# Patient Record
Sex: FEMALE | Race: OTHER | NOT HISPANIC OR LATINO
[De-identification: names, ages, dates, MRNs, and addresses within clinical notes are randomized per-mention and may not be internally consistent; named-entity substitution may affect disease eponyms.]

---

## 2019-06-27 PROBLEM — Z00.00 ENCOUNTER FOR PREVENTIVE HEALTH EXAMINATION: Status: ACTIVE | Noted: 2019-06-27

## 2019-07-24 ENCOUNTER — APPOINTMENT (OUTPATIENT)
Dept: NEUROLOGY | Facility: CLINIC | Age: 84
End: 2019-07-24
Payer: MEDICARE

## 2019-07-24 VITALS — SYSTOLIC BLOOD PRESSURE: 192 MMHG | DIASTOLIC BLOOD PRESSURE: 64 MMHG

## 2019-07-24 VITALS
TEMPERATURE: 98.5 F | SYSTOLIC BLOOD PRESSURE: 183 MMHG | OXYGEN SATURATION: 97 % | BODY MASS INDEX: 19.63 KG/M2 | DIASTOLIC BLOOD PRESSURE: 64 MMHG | HEIGHT: 60 IN | WEIGHT: 100 LBS | HEART RATE: 64 BPM

## 2019-07-24 DIAGNOSIS — R26.9 UNSPECIFIED ABNORMALITIES OF GAIT AND MOBILITY: ICD-10-CM

## 2019-07-24 PROCEDURE — 99205 OFFICE O/P NEW HI 60 MIN: CPT

## 2019-07-24 NOTE — PHYSICAL EXAM
[General Appearance - Well Nourished] : well nourished [General Appearance - Alert] : alert [General Appearance - In No Acute Distress] : in no acute distress [General Appearance - Well-Appearing] : healthy appearing [General Appearance - Well Developed] : well developed [Affect] : the affect was normal [Mood] : the mood was normal [Span Intact] : the attention span was normal [Concentration Intact] : normal concentrating ability [Fluency] : fluency intact [Comprehension] : comprehension intact [Motor Handedness Right-Handed] : the patient is right hand dominant [Sensation Tactile Decrease] : light touch was intact [Sensation Pain / Temperature Decrease] : pain and temperature was intact [1+] : Brachioradialis left 1+ [0] : Patella left 0 [FreeTextEntry1] : There is generalized motor slowing, with possibly a mild asymmetry, with the left side slower and with a slight worse performance on dexterity tasks.\par There is no tremor, no rigidity (some paratonia).\par She can stand without using her hands from the sitting position but her gait is hesitant, with mild widening of the base, and with some suggestion of magnetic gait. No shuffling.\par Postural reflexes are overall preserved. [Paresis Pronator Drift Right-Sided] : no pronator drift on the right [Paresis Pronator Drift Left-Sided] : no pronator drift on the left [Motor Strength Upper Extremities Bilaterally] : strength was normal in both upper extremities [Motor Strength Lower Extremities Bilaterally] : strength was normal in both lower extremities [Allodynia] : no ~T allodynia present [Dysesthesia] : no dysesthesia [Hyperesthesia] : no hyperesthesia [Coordination - Dysmetria Impaired Finger-to-Nose Bilateral] : not present [Dysdiadochokinesia Bilaterally] : not present [Plantar Reflex Right Only] : normal on the right [Plantar Reflex Left Only] : normal on the left [FreeTextEntry7] : there is loss of vibration in the lower extremities, to the knees, and loss of prorpiocepion also in the lower extremities

## 2019-07-24 NOTE — ASSESSMENT
[FreeTextEntry1] : 89 yo woman with cognitive problems and gait disorder that developed over the past 2 years, with progressive worsening.\par She has had a number of falls, with a hip fracture (late 2017) and a finger fracture (1 moth ago), but over the past month, since she moved in with her sister, there have not been falls.\par \par On exam there is evidence of significant cognitive decline, generalized slowing of movement, loss of vibration ad proprioception in the lower extremities.\par \par Will review laboratory results\par May request  TSH, B12, Folcate, Methylmalonic acid.\par Will obtain MRI\par \par Encouraged to increase exercise and physical activity and maintain an active social and intellectual life.\par More than 50% of the visit were dedicated to review of treatment, therapeutic plan, and prognosis, and patient was counseled on coping and physical and emotional wellbeing.\par \par \par
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
## 2019-07-25 LAB
FOLATE SERPL-MCNC: >20 NG/ML
TSH SERPL-ACNC: 1.48 UIU/ML
VIT B12 SERPL-MCNC: 803 PG/ML

## 2019-07-29 LAB — METHYLMALONATE SERPL-SCNC: 239 NMOL/L

## 2019-08-07 ENCOUNTER — OUTPATIENT (OUTPATIENT)
Dept: OUTPATIENT SERVICES | Facility: HOSPITAL | Age: 84
LOS: 1 days | End: 2019-08-07

## 2019-08-07 DIAGNOSIS — Z01.89 ENCOUNTER FOR OTHER SPECIFIED SPECIAL EXAMINATIONS: ICD-10-CM

## 2019-09-05 ENCOUNTER — APPOINTMENT (OUTPATIENT)
Dept: NEUROLOGY | Facility: CLINIC | Age: 84
End: 2019-09-05
Payer: MEDICARE

## 2019-09-05 VITALS
HEART RATE: 63 BPM | TEMPERATURE: 98.7 F | HEIGHT: 60 IN | OXYGEN SATURATION: 94 % | WEIGHT: 100 LBS | BODY MASS INDEX: 19.63 KG/M2 | SYSTOLIC BLOOD PRESSURE: 119 MMHG | DIASTOLIC BLOOD PRESSURE: 65 MMHG

## 2019-09-05 VITALS — SYSTOLIC BLOOD PRESSURE: 137 MMHG | DIASTOLIC BLOOD PRESSURE: 73 MMHG

## 2019-09-05 DIAGNOSIS — F03.90 UNSPECIFIED DEMENTIA W/OUT BEHAVIORAL DISTURBANCE: ICD-10-CM

## 2019-09-05 PROCEDURE — 99215 OFFICE O/P EST HI 40 MIN: CPT

## 2019-09-05 NOTE — DATA REVIEWED
[de-identified] : MRI brain 7/30/2019 showed chronic microvascular changes and parenchymal volume loss, stable in comparison to her MRI done on 5/26/2019.

## 2019-09-05 NOTE — HISTORY OF PRESENT ILLNESS
[FreeTextEntry1] : Josie Castañeda is a 89 yo RH female patient has experienced short term memory loss, a series of falls, loss of balance, urinary frequency/nocturia and daytime somnolence (falls asleep in public) since late 2018. She was referred by neuro psych, Dr. Veronica Tejeda. She has not had any MRI or Blessing scans completed. She is here for follow up.\par \par Initial history:\par Pt states she is very hesitant walking. She uses a walker both in and out of the home. She denies any shuffling however, she states her OT has noticed. She states she has had a loss of balance and needs to hold on to things at home if she walks without a walker. Her sister states that whenever she fell and hit her head she would be sent to the ED due to concerns with Eliquis. She has encountered two fractures during the falls. She denies any LOC, SDH, or traumatic brain injuries related to the falls. Two years ago, she had a serious fall in the bathroom when wearing socks and slipped that resulted in a fracture of L hip. The hip fracture was surgically fixed after. Pt denies any tremors. Pt states she is able to perform most ADLs independently with assistance when needed. She experiences some difficulty with fine motor movements such as buttoning clothes. \par \par Since the last visit, her labs for B12, folate, TSH and methylmalonic acid returned within normal limits. Her MRI of the brain showed chronic microvascular disease and parenchymal volume loss. She has had two falls at home. Daytime sleepiness has improved, but about 10 days ago and was having breakfast, and the patient had fallen off the chair. The other fall was losing balance while getting something out of the closet. She has had about 11 falls in the last 6 months. Short term memory is worsening. She forgets things that she was told maybe 20 minutes prior i.e. meeting her sister at the corner store. In April 2019, she moved in with her sister when the symptoms were not as severe as they are now. 1.5 months ago walked to another part of her neighbourhood, which is familiar to her, looked up and was confused about where she was. But, was able to find herself home after someone redirected her.\par \par Pt has a history of labile blood pressures, tends to be more hypotensive. Pt denies dizziness, lightheadedness, and other near syncopal episodes related to OH. Today BP is 119/65 sitting and 137/73 standing.\par \par Pt states she is not as active. \par Pt had PT coming to the house, but now is going to start outpatient therapy. \par She completed OT. \par \par Pt states sleep is normal. Pt states she experiences nightmares and vivid dreams. Pt denies screaming, kicking, hitting acting out dreams or other related RBD movements. \par Pt states she experiences urinary frequency and nocturia. She takes about 3-4 bathroom trips a night.\par Pt states she has noticed a change in short term memory loss such as forgetting to take her medications and got lost going home.\par Pt states she believes recently she feels down due to age related changes and her memory impairment.\par Pt states she experiences severe daytime somnolence. 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 Does have an aid that comes in twice a week. \par Goes out a lot with her sister\par \par Medical History\par One occurrence of SVT; now on amiodarone\par Hypotension\par Rheumatoid Arthritis\par Glaucoma\par Osteoporosis \par Breast cancer- lumpectomy; 15 yrs ago\par Hypothyroidism\par Herpes\par \par Family History\par Pt denies any family history of PD or movement disorders.

## 2019-09-05 NOTE — ASSESSMENT
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 She has had a number of falls, with a hip fracture (late 2017) and a finger fracture (June 2019), but over the past month, since she moved in with her sister in April 2019, there has been progressive worsening of her memory, daytime somnolence, and falls. She has fallen at least 11 times in the last 6 months.\par \par On exam there is evidence of significant cognitive decline (MoCA 17/30), generalized slowing of movement, loss of vibration ad proprioception in the lower extremities, unable to tandem walk.\par \par MRI brain done at [facility] in July 2019: chronic microvascular changes and parenchymal volume loss diffusely. \par B12, folate, TSH, and methylmalonic acid within normal limits.\par Sodium level previously 132. \par \par Recommendations:\par - Will start Aricept 5mg daily x 4 weeks, then increase to 10mg daily\par - After two weeks of Aricept 5mg daily, will start Provigil 50mg daily, increasing by 50mg each week to a goal dose of 100mg BID (schedule provided to the patient's sister)\par - Encouraged to increase exercise and physical activity and maintain an active social and intellectual life.\par \par More than 50% of the visit were dedicated to review of treatment, therapeutic plan, and prognosis, and patient was counseled on coping and physical and emotional wellbeing.\par \par RTC 2-3 months\par

## 2019-09-05 NOTE — PHYSICAL EXAM
[General Appearance - Alert] : alert [General Appearance - In No Acute Distress] : in no acute distress [General Appearance - Well Nourished] : well nourished [General Appearance - Well Developed] : well developed [Affect] : the affect was normal [General Appearance - Well-Appearing] : healthy appearing [Mood] : the mood was normal [Span Intact] : the attention span was normal [Concentration Intact] : normal concentrating ability [Fluency] : fluency intact [Motor Handedness Right-Handed] : the patient is right hand dominant [Comprehension] : comprehension intact [Sensation Tactile Decrease] : light touch was intact [Sensation Pain / Temperature Decrease] : pain and temperature was intact [1+] : Brachioradialis left 1+ [0] : Ankle jerk left 0 [___ / 30] : the patient achieved a total score of [unfilled] /30 [___ / 5] : Visuospatial / Executive: [unfilled] / 5 [0 / 0] : Memory: 0 / 0 [___ / 3] : Attention (Serial 7 subtraction): [unfilled] / 3 [___ / 1] : Fluency: [unfilled] / 1 [___ / 2] : Abstraction: [unfilled] / 2 [___ / 5] : Delayed Recall: [unfilled] / 5 [___ / 6] : Orientation: [unfilled] / 6 [Person] : oriented to person [FreeTextEntry1] : There is generalized motor slowing, with possibly a mild asymmetry, with the left side slower and with a slight worse performance on dexterity tasks.\par There is no tremor, no rigidity (some paratonia).\par She can stand without using her hands from the sitting position but her gait is hesitant, with mild widening of the base, with short stride, and occasional magnetism of the R foot. No shuffling. Mild decreased arm swing on the R.\par Postural reflexes are preserved. She is unable to tandem walk.\par Negative Glabellar sign, No palmomental sign, and no grasp reflex. [Place] : disoriented to place [Time] : disoriented to time [Paresis Pronator Drift Right-Sided] : no pronator drift on the right [Paresis Pronator Drift Left-Sided] : no pronator drift on the left [Motor Strength Upper Extremities Bilaterally] : strength was normal in both upper extremities [Allodynia] : no ~T allodynia present [Motor Strength Lower Extremities Bilaterally] : strength was normal in both lower extremities [Dysesthesia] : no dysesthesia [Hyperesthesia] : no hyperesthesia [Dysdiadochokinesia Bilaterally] : not present [Coordination - Dysmetria Impaired Finger-to-Nose Bilateral] : not present [Plantar Reflex Left Only] : normal on the left [Plantar Reflex Right Only] : normal on the right [FreeTextEntry7] : there is loss of vibration in the lower extremities, to the knees, and loss of prorpiocepion also in the lower extremities

## 2019-09-05 NOTE — END OF VISIT
[] : Fellow [FreeTextEntry3] : Patient's history reviewed with fellow, patient examined with fellow, assessment and plan discussed with fellow.\par

## 2019-09-13 ENCOUNTER — RX RENEWAL (OUTPATIENT)
Age: 84
End: 2019-09-13

## 2019-11-20 ENCOUNTER — APPOINTMENT (OUTPATIENT)
Dept: NEUROLOGY | Facility: CLINIC | Age: 84
End: 2019-11-20
Payer: MEDICARE

## 2019-11-20 VITALS — DIASTOLIC BLOOD PRESSURE: 69 MMHG | OXYGEN SATURATION: 100 % | SYSTOLIC BLOOD PRESSURE: 153 MMHG | HEART RATE: 55 BPM

## 2019-11-20 VITALS — SYSTOLIC BLOOD PRESSURE: 168 MMHG | DIASTOLIC BLOOD PRESSURE: 71 MMHG | OXYGEN SATURATION: 98 % | HEART RATE: 55 BPM

## 2019-11-20 PROCEDURE — 99215 OFFICE O/P EST HI 40 MIN: CPT

## 2019-11-20 RX ORDER — DONEPEZIL HYDROCHLORIDE 10 MG/1
10 TABLET ORAL DAILY
Qty: 90 | Refills: 3 | Status: DISCONTINUED | COMMUNITY
Start: 2019-09-05 | End: 2019-11-20
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
## 2019-11-20 NOTE — ASSESSMENT
[FreeTextEntry1] : \par 89 yo woman with cognitive problems and gait disorder that developed since 2016 with progressive worsening. She has had a number of falls, with a hip fracture (late 2017) and a finger fracture (June 2019), but over the past month, since she moved in with her sister in April 2019, there has been progressive worsening of her memory, daytime somnolence, and falls. She had 2 falls since last visit, with no injuries\par \par On exam there is evidence of significant cognitive decline (MoCA 17/30 in September 2019), generalized slowing of movement, loss of vibration ad proprioception in the lower extremities, unable to tandem walk.\par MRI brain done at [facility] in July 2019: chronic microvascular changes and parenchymal volume loss diffusely. \par B12, folate, TSH, and methylmalonic acid within normal limits.\par Sodium level previously 132. \par \par Did not start Aricept and Provigil as recommend because of concern for side effect, cardiology concern for QT prolongation and general concern for Provigil.\par \par Discussed again benefit of Provigil, which may be beneficial as her main complaint is driowsiness and daytime somnolence.\par Will start Provigil 50mg daily, increasing by 50mg each week to a goal dose of 100mg BID (schedule provided to the patient's sister)\par - Encouraged to increase exercise and physical activity and maintain an active social and intellectual life.\par \par Discussed treatment, therapeutic plan, and prognosis, and patient was counseled on lifestyle, coping, and physical and emotional wellbeing.\par

## 2020-03-04 ENCOUNTER — APPOINTMENT (OUTPATIENT)
Dept: NEUROLOGY | Facility: CLINIC | Age: 85
End: 2020-03-04
Payer: MEDICARE

## 2020-03-04 VITALS
DIASTOLIC BLOOD PRESSURE: 64 MMHG | TEMPERATURE: 98.7 F | WEIGHT: 105 LBS | SYSTOLIC BLOOD PRESSURE: 167 MMHG | BODY MASS INDEX: 20.51 KG/M2 | HEART RATE: 57 BPM | OXYGEN SATURATION: 97 %

## 2020-03-04 PROCEDURE — 99214 OFFICE O/P EST MOD 30 MIN: CPT

## 2020-07-08 ENCOUNTER — APPOINTMENT (OUTPATIENT)
Dept: NEUROLOGY | Facility: CLINIC | Age: 85
End: 2020-07-08
Payer: MEDICARE

## 2020-07-08 VITALS
HEART RATE: 57 BPM | DIASTOLIC BLOOD PRESSURE: 65 MMHG | OXYGEN SATURATION: 97 % | SYSTOLIC BLOOD PRESSURE: 179 MMHG | TEMPERATURE: 98.2 F

## 2020-07-08 VITALS — DIASTOLIC BLOOD PRESSURE: 64 MMHG | SYSTOLIC BLOOD PRESSURE: 176 MMHG

## 2020-07-08 PROCEDURE — 99214 OFFICE O/P EST MOD 30 MIN: CPT

## 2020-07-08 NOTE — ASSESSMENT
[FreeTextEntry1] : 89 yo woman with cognitive problems and gait disorder that developed since 2016 with progressive worsening. She has had a number of falls, with a hip fracture (late 2017) and a finger fracture (June 2019). She had extensive work-up MRI brain done at [facility] in July 2019: chronic microvascular changes and parenchymal volume loss diffusely. B12, folate, TSH, and methylmalonic acid within normal limits.\par \par Since her previous visit she has improved her balance is better, she had only one fall in the past 4 months,  , and her cognitive function has not declined further.\par The main problem remains daytime somnolence.\par \par Did not start Aricept and Provigil as recommend at previous visits.\par \par Plan:\par Discussed again benefit of Provigil, which may be beneficial as her main complaint is drowsiness and daytime somnolence.\par Recommended again Provigil 50 mg daily, increasing by 50 mg each week to a goal dose of 100 mg BID (schedule provided to the patient's sister)\par \par Encouraged to increase exercise and physical activity and maintain an active social and intellectual life, compatible with COVID restrictions\par \par More than 50 % of time was used to discuss treatment, therapeutic plan, and prognosis, and patient was counseled on lifestyle, coping, and physical and emotional wellbeing.\par

## 2020-07-08 NOTE — PHYSICAL EXAM
[FreeTextEntry1] : \par Overall exam mildly improved.\par There is mild generalized motor slowing, with possibly a mild asymmetry, with the left side slower and with a slight worse performance on dexterity tasks.\par There is no tremor, no rigidity with mild paratonia.\par She can stand with folded arms with ease (could not do so on the past) without using her hands from the sitting position but her gait is hesitant, with mild widening of the base, with short stride, there is no longer magnetism in her gait. No shuffling. Mild decreased arm swing on the R. Can walk with ease when holding to my arm.\par Postural reflexes are mildly impaired.\par \par Constitutional: mild cognitive impairment with decreased attention and memory. She is alert, in no acute distress.\par Psychiatric: the affect was normal and the mood was normal. \par Attention span was normal and normal concentrating ability. \par Language fluency intact and comprehension intact. \par  \par

## 2020-07-08 NOTE — HISTORY OF PRESENT ILLNESS
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 She has had a number of falls, with a hip fracture (late 2017) and a finger fracture (June 2019), but over the past month, since she moved in with her sister in April 2019, there has been progressive worsening of her memory, daytime somnolence, and falls. She has fallen at least 11 times in the last 6 months.\par \par Since the last visit , pt was advised to start  Provigil 50 mg daily, increasing by 50 mg each week to a goal dose of 100 mg BID (schedule provided to the patient's sister) which she has not started. Pt sister states she falls asleep every morning at breakfast table. \par \par Overall, pt states her balance is unchanged since last visit. Pt currently ambulates using walker. While in her house, she is now trying to not use the walker and also tries to not hold to furniture. When leaving the house, she necessitates a walker with supervision of an aide. Pt states she had 1 recent fall. Pt was walking and tripped on a rug; she was unable to get up alone or with her sister. They called a friend to help her up from the floor. Pt has some difficulty with manual dexterity including buttons and zippers. Pt is able to perform ADLs independently with some supervision when bathing.\par \par Pt has a history of labile blood pressures, tends to be more hypotensive. Pt denies dizziness, lightheadedness, and other near syncopal episodes related to OH. Today BP is 179/65 sitting and 176/64 standing. 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  \par \par Pt states sleep is normal with about 8-9 hr/night. Pt denies any nightmares or vivid dreams. Pt denies any RBD related movements.\par Pt states she experiences constipation.\par Pt states she experiences urinary frequency and nocturia. She takes about 3-4 bathroom trips a night.\par Pt states she has noticed a change in short term memory loss such as forgetting to take her medications and got lost going home. She decided not to start the Aricept. \par Pt states she experiences daytime somnolence. She does not take naps. She decided not to start the Provigil.\par \par Medications:\par Provigil 100 mg --> not started\par Lipitor 10 mg\par Amiodarone HCl 100 mg QD\par Eliquis 2.5 mg BID\par Folic Acid \par

## 2020-07-13 ENCOUNTER — APPOINTMENT (OUTPATIENT)
Dept: NEUROLOGY | Facility: CLINIC | Age: 85
End: 2020-07-13
Payer: MEDICARE

## 2020-07-13 PROCEDURE — 99213 OFFICE O/P EST LOW 20 MIN: CPT | Mod: 95

## 2020-07-23 ENCOUNTER — APPOINTMENT (OUTPATIENT)
Dept: NEUROLOGY | Facility: CLINIC | Age: 85
End: 2020-07-23
Payer: MEDICARE

## 2020-07-23 PROCEDURE — 99442: CPT | Mod: 95

## 2020-07-31 ENCOUNTER — APPOINTMENT (OUTPATIENT)
Dept: NEUROLOGY | Facility: CLINIC | Age: 85
End: 2020-07-31
Payer: MEDICARE

## 2020-07-31 PROCEDURE — 99215 OFFICE O/P EST HI 40 MIN: CPT | Mod: 95

## 2020-08-11 ENCOUNTER — APPOINTMENT (OUTPATIENT)
Dept: NEUROLOGY | Facility: CLINIC | Age: 85
End: 2020-08-11
Payer: MEDICARE

## 2020-08-11 PROCEDURE — 99215 OFFICE O/P EST HI 40 MIN: CPT | Mod: 95

## 2020-08-18 ENCOUNTER — APPOINTMENT (OUTPATIENT)
Dept: NEUROLOGY | Facility: CLINIC | Age: 85
End: 2020-08-18
Payer: MEDICARE

## 2020-08-18 PROCEDURE — 99443: CPT | Mod: 95

## 2020-08-25 ENCOUNTER — APPOINTMENT (OUTPATIENT)
Dept: NEUROLOGY | Facility: CLINIC | Age: 85
End: 2020-08-25
Payer: MEDICARE

## 2020-08-25 ENCOUNTER — NON-APPOINTMENT (OUTPATIENT)
Age: 85
End: 2020-08-25

## 2020-08-25 VITALS — SYSTOLIC BLOOD PRESSURE: 174 MMHG | DIASTOLIC BLOOD PRESSURE: 54 MMHG

## 2020-08-25 VITALS
HEART RATE: 59 BPM | DIASTOLIC BLOOD PRESSURE: 68 MMHG | TEMPERATURE: 97 F | SYSTOLIC BLOOD PRESSURE: 159 MMHG | HEIGHT: 60 IN | OXYGEN SATURATION: 97 %

## 2020-08-25 PROCEDURE — 99215 OFFICE O/P EST HI 40 MIN: CPT

## 2020-09-04 ENCOUNTER — NON-APPOINTMENT (OUTPATIENT)
Age: 85
End: 2020-09-04

## 2020-09-04 ENCOUNTER — APPOINTMENT (OUTPATIENT)
Dept: NEUROLOGY | Facility: CLINIC | Age: 85
End: 2020-09-04
Payer: MEDICARE

## 2020-09-04 DIAGNOSIS — R35.0 FREQUENCY OF MICTURITION: ICD-10-CM

## 2020-09-04 PROCEDURE — 99215 OFFICE O/P EST HI 40 MIN: CPT | Mod: 95

## 2020-09-09 ENCOUNTER — APPOINTMENT (OUTPATIENT)
Dept: NEUROLOGY | Facility: CLINIC | Age: 85
End: 2020-09-09
Payer: MEDICARE

## 2020-09-09 PROCEDURE — 99442: CPT | Mod: 95

## 2020-11-18 ENCOUNTER — APPOINTMENT (OUTPATIENT)
Dept: NEUROLOGY | Facility: CLINIC | Age: 85
End: 2020-11-18

## 2020-11-30 DIAGNOSIS — R40.0 SOMNOLENCE: ICD-10-CM

## 2021-01-27 ENCOUNTER — APPOINTMENT (OUTPATIENT)
Dept: NEUROLOGY | Facility: CLINIC | Age: 86
End: 2021-01-27
Payer: MEDICARE

## 2021-01-27 VITALS
SYSTOLIC BLOOD PRESSURE: 173 MMHG | TEMPERATURE: 97.3 F | BODY MASS INDEX: 17.47 KG/M2 | DIASTOLIC BLOOD PRESSURE: 63 MMHG | WEIGHT: 89 LBS | HEIGHT: 60 IN | HEART RATE: 87 BPM | OXYGEN SATURATION: 97 %

## 2021-01-27 VITALS — OXYGEN SATURATION: 100 % | SYSTOLIC BLOOD PRESSURE: 199 MMHG | HEART RATE: 67 BPM | DIASTOLIC BLOOD PRESSURE: 68 MMHG

## 2021-01-27 LAB
25(OH)D3 SERPL-MCNC: 43.9 NG/ML
FOLATE SERPL-MCNC: >20 NG/ML
VIT B12 SERPL-MCNC: 943 PG/ML

## 2021-01-27 PROCEDURE — 99215 OFFICE O/P EST HI 40 MIN: CPT

## 2021-03-15 ENCOUNTER — APPOINTMENT (OUTPATIENT)
Dept: NEUROLOGY | Facility: CLINIC | Age: 86
End: 2021-03-15
Payer: MEDICARE

## 2021-03-15 VITALS
WEIGHT: 97 LBS | BODY MASS INDEX: 19.04 KG/M2 | SYSTOLIC BLOOD PRESSURE: 135 MMHG | DIASTOLIC BLOOD PRESSURE: 69 MMHG | HEART RATE: 62 BPM | HEIGHT: 60 IN | OXYGEN SATURATION: 97 % | TEMPERATURE: 97.2 F

## 2021-03-15 VITALS — OXYGEN SATURATION: 97 % | DIASTOLIC BLOOD PRESSURE: 66 MMHG | SYSTOLIC BLOOD PRESSURE: 129 MMHG | HEART RATE: 65 BPM

## 2021-03-15 PROCEDURE — 99215 OFFICE O/P EST HI 40 MIN: CPT

## 2021-05-26 ENCOUNTER — NON-APPOINTMENT (OUTPATIENT)
Age: 86
End: 2021-05-26

## 2021-05-26 DIAGNOSIS — S09.90XA UNSPECIFIED INJURY OF HEAD, INITIAL ENCOUNTER: ICD-10-CM

## 2021-05-27 ENCOUNTER — NON-APPOINTMENT (OUTPATIENT)
Age: 86
End: 2021-05-27

## 2021-05-27 ENCOUNTER — APPOINTMENT (OUTPATIENT)
Dept: CT IMAGING | Facility: HOSPITAL | Age: 86
End: 2021-05-27
Payer: MEDICARE

## 2021-05-27 ENCOUNTER — OUTPATIENT (OUTPATIENT)
Dept: OUTPATIENT SERVICES | Facility: HOSPITAL | Age: 86
LOS: 1 days | End: 2021-05-27
Payer: MEDICARE

## 2021-05-27 PROCEDURE — 70450 CT HEAD/BRAIN W/O DYE: CPT | Mod: 26,MG

## 2021-05-27 PROCEDURE — G1004: CPT

## 2021-05-27 PROCEDURE — 70450 CT HEAD/BRAIN W/O DYE: CPT

## 2021-06-02 ENCOUNTER — APPOINTMENT (OUTPATIENT)
Dept: NEUROLOGY | Facility: CLINIC | Age: 86
End: 2021-06-02

## 2021-06-25 ENCOUNTER — NON-APPOINTMENT (OUTPATIENT)
Age: 86
End: 2021-06-25

## 2021-07-30 ENCOUNTER — APPOINTMENT (OUTPATIENT)
Dept: NEUROLOGY | Facility: CLINIC | Age: 86
End: 2021-07-30
Payer: MEDICARE

## 2021-07-30 VITALS — OXYGEN SATURATION: 95 % | HEART RATE: 77 BPM | SYSTOLIC BLOOD PRESSURE: 156 MMHG | DIASTOLIC BLOOD PRESSURE: 73 MMHG

## 2021-07-30 VITALS
DIASTOLIC BLOOD PRESSURE: 73 MMHG | HEART RATE: 60 BPM | TEMPERATURE: 97.6 F | HEIGHT: 60 IN | RESPIRATION RATE: 14 BRPM | OXYGEN SATURATION: 96 % | WEIGHT: 97 LBS | BODY MASS INDEX: 19.04 KG/M2 | SYSTOLIC BLOOD PRESSURE: 132 MMHG

## 2021-07-30 PROCEDURE — 99215 OFFICE O/P EST HI 40 MIN: CPT

## 2021-09-21 RX ORDER — MODAFINIL 200 MG/1
200 TABLET ORAL
Qty: 180 | Refills: 1 | Status: ACTIVE | COMMUNITY
Start: 2019-09-05 | End: 1900-01-01

## 2021-11-10 ENCOUNTER — APPOINTMENT (OUTPATIENT)
Dept: NEUROLOGY | Facility: CLINIC | Age: 86
End: 2021-11-10
Payer: MEDICARE

## 2021-11-10 VITALS
TEMPERATURE: 97.5 F | WEIGHT: 95 LBS | HEIGHT: 60 IN | DIASTOLIC BLOOD PRESSURE: 61 MMHG | SYSTOLIC BLOOD PRESSURE: 148 MMHG | BODY MASS INDEX: 18.65 KG/M2

## 2021-11-10 PROCEDURE — 99215 OFFICE O/P EST HI 40 MIN: CPT

## 2021-11-10 RX ORDER — QUETIAPINE FUMARATE 25 MG/1
25 TABLET ORAL
Qty: 15 | Refills: 11 | Status: ACTIVE | COMMUNITY
Start: 2021-11-10 | End: 1900-01-01

## 2022-01-07 ENCOUNTER — NON-APPOINTMENT (OUTPATIENT)
Age: 87
End: 2022-01-07

## 2022-01-14 ENCOUNTER — APPOINTMENT (OUTPATIENT)
Dept: NEUROLOGY | Facility: CLINIC | Age: 87
End: 2022-01-14

## 2022-04-06 ENCOUNTER — APPOINTMENT (OUTPATIENT)
Dept: NEUROLOGY | Facility: CLINIC | Age: 87
End: 2022-04-06